# Patient Record
(demographics unavailable — no encounter records)

---
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
## 2021-04-02 NOTE — XRAY REPORT
LUMBAR SPINE 2 VIEWS



INDICATION:

back pain



COMPARISON:

None.



FINDINGS:

There is no fracture, subluxation, or other acute radiographic abnormality of the lumbar spine. Poste
rior fusion from T12 to L2 is noted. Hardware appears intact.



Signer Name: Paul Reddy MD 

Signed: 4/2/2021 11:40 PM

Workstation Name: [id number]